# Patient Record
Sex: FEMALE | Race: BLACK OR AFRICAN AMERICAN | NOT HISPANIC OR LATINO | URBAN - METROPOLITAN AREA
[De-identification: names, ages, dates, MRNs, and addresses within clinical notes are randomized per-mention and may not be internally consistent; named-entity substitution may affect disease eponyms.]

---

## 2021-06-13 ENCOUNTER — EMERGENCY (EMERGENCY)
Facility: HOSPITAL | Age: 31
LOS: 0 days | Discharge: ANOTHER HOSPITAL | End: 2021-06-13
Attending: EMERGENCY MEDICINE
Payer: SELF-PAY

## 2021-06-13 ENCOUNTER — OUTPATIENT (OUTPATIENT)
Dept: OUTPATIENT SERVICES | Facility: HOSPITAL | Age: 31
LOS: 1 days | End: 2021-06-13

## 2021-06-13 VITALS
DIASTOLIC BLOOD PRESSURE: 66 MMHG | RESPIRATION RATE: 18 BRPM | OXYGEN SATURATION: 99 % | HEART RATE: 100 BPM | SYSTOLIC BLOOD PRESSURE: 109 MMHG

## 2021-06-13 VITALS
OXYGEN SATURATION: 97 % | DIASTOLIC BLOOD PRESSURE: 78 MMHG | WEIGHT: 198.42 LBS | SYSTOLIC BLOOD PRESSURE: 114 MMHG | RESPIRATION RATE: 19 BRPM | HEART RATE: 105 BPM | TEMPERATURE: 99 F | HEIGHT: 65 IN

## 2021-06-13 VITALS
TEMPERATURE: 99 F | DIASTOLIC BLOOD PRESSURE: 80 MMHG | HEART RATE: 96 BPM | SYSTOLIC BLOOD PRESSURE: 137 MMHG | RESPIRATION RATE: 16 BRPM

## 2021-06-13 VITALS — TEMPERATURE: 98 F

## 2021-06-13 DIAGNOSIS — Z20.822 CONTACT WITH AND (SUSPECTED) EXPOSURE TO COVID-19: ICD-10-CM

## 2021-06-13 DIAGNOSIS — O36.8390 MATERNAL CARE FOR ABNORMALITIES OF THE FETAL HEART RATE OR RHYTHM, UNSPECIFIED TRIMESTER, NOT APPLICABLE OR UNSPECIFIED: ICD-10-CM

## 2021-06-13 DIAGNOSIS — Z34.90 ENCOUNTER FOR SUPERVISION OF NORMAL PREGNANCY, UNSPECIFIED, UNSPECIFIED TRIMESTER: ICD-10-CM

## 2021-06-13 DIAGNOSIS — O36.8190 DECREASED FETAL MOVEMENTS, UNSPECIFIED TRIMESTER, NOT APPLICABLE OR UNSPECIFIED: ICD-10-CM

## 2021-06-13 LAB
ALBUMIN SERPL ELPH-MCNC: 2.8 G/DL — LOW (ref 3.3–5)
ALLERGY+IMMUNOLOGY DIAG STUDY NOTE: SIGNIFICANT CHANGE UP
ALP SERPL-CCNC: 90 U/L — SIGNIFICANT CHANGE UP (ref 40–120)
ALT FLD-CCNC: 31 U/L — SIGNIFICANT CHANGE UP (ref 12–78)
ANION GAP SERPL CALC-SCNC: 7 MMOL/L — SIGNIFICANT CHANGE UP (ref 5–17)
ANISOCYTOSIS BLD QL: SLIGHT — SIGNIFICANT CHANGE UP
AST SERPL-CCNC: 22 U/L — SIGNIFICANT CHANGE UP (ref 15–37)
BASOPHILS # BLD AUTO: 0 K/UL — SIGNIFICANT CHANGE UP (ref 0–0.2)
BASOPHILS NFR BLD AUTO: 0 % — SIGNIFICANT CHANGE UP (ref 0–2)
BILIRUB SERPL-MCNC: 0.6 MG/DL — SIGNIFICANT CHANGE UP (ref 0.2–1.2)
BLD GP AB SCN SERPL QL: SIGNIFICANT CHANGE UP
BUN SERPL-MCNC: 7 MG/DL — SIGNIFICANT CHANGE UP (ref 7–23)
CALCIUM SERPL-MCNC: 8.8 MG/DL — SIGNIFICANT CHANGE UP (ref 8.5–10.1)
CHLORIDE SERPL-SCNC: 108 MMOL/L — SIGNIFICANT CHANGE UP (ref 96–108)
CO2 SERPL-SCNC: 23 MMOL/L — SIGNIFICANT CHANGE UP (ref 22–31)
CREAT SERPL-MCNC: 0.63 MG/DL — SIGNIFICANT CHANGE UP (ref 0.5–1.3)
DIR ANTIGLOB POLYSPECIFIC INTERPRETATION: SIGNIFICANT CHANGE UP
EOSINOPHIL # BLD AUTO: 0.09 K/UL — SIGNIFICANT CHANGE UP (ref 0–0.5)
EOSINOPHIL NFR BLD AUTO: 1 % — SIGNIFICANT CHANGE UP (ref 0–6)
FLUAV AG NPH QL: SIGNIFICANT CHANGE UP
FLUBV AG NPH QL: SIGNIFICANT CHANGE UP
GLUCOSE SERPL-MCNC: 117 MG/DL — HIGH (ref 70–99)
HCG SERPL-ACNC: HIGH MIU/ML
HCT VFR BLD CALC: 35.9 % — SIGNIFICANT CHANGE UP (ref 34.5–45)
HGB BLD-MCNC: 11.9 G/DL — SIGNIFICANT CHANGE UP (ref 11.5–15.5)
HYPOCHROMIA BLD QL: SLIGHT — SIGNIFICANT CHANGE UP
LYMPHOCYTES # BLD AUTO: 1.29 K/UL — SIGNIFICANT CHANGE UP (ref 1–3.3)
LYMPHOCYTES # BLD AUTO: 14 % — SIGNIFICANT CHANGE UP (ref 13–44)
MANUAL SMEAR VERIFICATION: SIGNIFICANT CHANGE UP
MCHC RBC-ENTMCNC: 26.6 PG — LOW (ref 27–34)
MCHC RBC-ENTMCNC: 33.1 GM/DL — SIGNIFICANT CHANGE UP (ref 32–36)
MCV RBC AUTO: 80.1 FL — SIGNIFICANT CHANGE UP (ref 80–100)
MICROCYTES BLD QL: SLIGHT — SIGNIFICANT CHANGE UP
MONOCYTES # BLD AUTO: 0.37 K/UL — SIGNIFICANT CHANGE UP (ref 0–0.9)
MONOCYTES NFR BLD AUTO: 4 % — SIGNIFICANT CHANGE UP (ref 2–14)
NEUTROPHILS # BLD AUTO: 7.48 K/UL — HIGH (ref 1.8–7.4)
NEUTROPHILS NFR BLD AUTO: 81 % — HIGH (ref 43–77)
NRBC # BLD: 0 /100 — SIGNIFICANT CHANGE UP (ref 0–0)
NRBC # BLD: SIGNIFICANT CHANGE UP /100 WBCS (ref 0–0)
PLAT MORPH BLD: NORMAL — SIGNIFICANT CHANGE UP
PLATELET # BLD AUTO: 218 K/UL — SIGNIFICANT CHANGE UP (ref 150–400)
POLYCHROMASIA BLD QL SMEAR: SLIGHT — SIGNIFICANT CHANGE UP
POTASSIUM SERPL-MCNC: 3.7 MMOL/L — SIGNIFICANT CHANGE UP (ref 3.5–5.3)
POTASSIUM SERPL-SCNC: 3.7 MMOL/L — SIGNIFICANT CHANGE UP (ref 3.5–5.3)
PROT SERPL-MCNC: 6.9 GM/DL — SIGNIFICANT CHANGE UP (ref 6–8.3)
RBC # BLD: 4.48 M/UL — SIGNIFICANT CHANGE UP (ref 3.8–5.2)
RBC # FLD: 14.5 % — SIGNIFICANT CHANGE UP (ref 10.3–14.5)
RBC BLD AUTO: ABNORMAL
SARS-COV-2 RNA SPEC QL NAA+PROBE: SIGNIFICANT CHANGE UP
SODIUM SERPL-SCNC: 138 MMOL/L — SIGNIFICANT CHANGE UP (ref 135–145)
WBC # BLD: 9.23 K/UL — SIGNIFICANT CHANGE UP (ref 3.8–10.5)
WBC # FLD AUTO: 9.23 K/UL — SIGNIFICANT CHANGE UP (ref 3.8–10.5)

## 2021-06-13 PROCEDURE — 86077 PHYS BLOOD BANK SERV XMATCH: CPT

## 2021-06-13 PROCEDURE — 99284 EMERGENCY DEPT VISIT MOD MDM: CPT

## 2021-06-13 NOTE — ED ADULT NURSE NOTE - OBJECTIVE STATEMENT
32 y/o female 33 weeks pregnant with c/c of non fetal movement since 930 pm. Baby is very active on a daily basis. pt denies any n/v/d/c

## 2021-06-13 NOTE — OB PROVIDER TRIAGE NOTE - HISTORY OF PRESENT ILLNESS
32 y/o  @ 32.6 wks gestation presents with c/o decrease FM since last evening pt [place] 2 [place] ago and states her last PNV was ~ 1 month ago and arrived this AM @ [facility] and now reports +FM denies any LOF or VB denies any n/v/d denies any fever or chills ap care uncomplicated thus far     pt is rH- neg and recd Rhogam @ 28 wks gestation   pt given clinic phone numbers     cat 1 FHT  toco: x's 2 uterine contractions noted pt denies any c/o at this time   TAS: BPP;  vtx posterior placenta INNA: 10.65 EFW:1938 grams   T(C): 36.9 (21 @ 12:28), Max: 37.2 (21 @ 10:15)  HR: 97 (21 @ 12:39) (97 - 105)  BP: 123/74 (21 @ 12:39) (109/66 - 123/74)  RR: 18 (21 @ 11:02) (18 - 19)  SpO2: 99% (21 @ 11:02) (97% - 99%)

## 2021-06-13 NOTE — ED PROVIDER NOTE - IV ALTEPLASE INCLUSION HIDDEN
"Chief Complaint   Patient presents with     Prenatal Care     6 week pp       Initial /57 (BP Location: Left arm, Patient Position: Chair, Cuff Size: Adult Regular)  Pulse 63  Temp 98.2  F (36.8  C) (Oral)  Resp 16  Wt 157 lb 14.4 oz (71.6 kg)  LMP 12/09/2016 (Exact Date)  SpO2 98%  Breastfeeding? Yes  BMI 27.1 kg/m2 Estimated body mass index is 27.1 kg/(m^2) as calculated from the following:    Height as of 9/20/17: 5' 4\" (1.626 m).    Weight as of this encounter: 157 lb 14.4 oz (71.6 kg).  Medication Reconciliation: complete  Carolina Squires M.A.  "
show

## 2021-06-13 NOTE — ED ADULT TRIAGE NOTE - CHIEF COMPLAINT QUOTE
33 week pregnant and not feeling baby moving since yesterday  Denies cramping and bleeding  Flew from [place] 2 weeks ago

## 2021-06-13 NOTE — ED PROVIDER NOTE - OBJECTIVE STATEMENT
Pt is a 31 year old female  LMP 10/26/20, who presents to the ED today for decreased fetal movement. Pt states baby has been active throughout the pregnancy but since 1 pm there has been decreased movement. Last movement 9:30 pm and has not felt anything since. Denies vaginal bleed leakage of fluids, fevers, hematuria, dysuria, abdominal pain, or vomit. Pt is accompanied by sister who states pt just arrived from [place] 1.5 weeks ago. Pt had prenatal care in [place] and treated once for a yeast infection. No other illness or infection.

## 2021-06-13 NOTE — OB PROVIDER TRIAGE NOTE - ADDITIONAL INSTRUCTIONS
30 y/o  @ 32.6 presents via EMS with c/o decrease FM and now reports +FM  maternal and fetal status reassuring   d/w dr cavanaugh  discharge home   PTL precautions d/w pt  increase fluid intake  instructed on fetal kickcounts  pt to make appt with [facility] clinic phone numbers given to pt  w/v discharge instructions given  discharged home

## 2021-06-13 NOTE — OB PROVIDER TRIAGE NOTE - NS_CHIEFCOMPLAINTOTHER_OBGYN_ALL_OB_FT
Transfer from [place].  CC:  Dec FM since 6/12/21 @ 2100.  [facility] in [facility]

## 2021-06-13 NOTE — OB PROVIDER TRIAGE NOTE - NSHPPHYSICALEXAM_GEN_ALL_CORE
cat 1 FHT  toco: x's 2 uterine contractions noted pt denies any c/o at this time   TAS: BPP; 8/8 vtx posterior placenta INNA: 10.65 EFW:1938 grams   T(C): 36.9 (06-13-21 @ 12:28), Max: 37.2 (06-13-21 @ 10:15)  HR: 97 (06-13-21 @ 12:39) (97 - 105)  BP: 123/74 (06-13-21 @ 12:39) (109/66 - 123/74)  RR: 18 (06-13-21 @ 11:02) (18 - 19)  SpO2: 99% (06-13-21 @ 11:02) (97% - 99%)

## 2021-06-13 NOTE — ED ADULT NURSE NOTE - PAIN RATING/NUMBER SCALE (0-10): ACTIVITY
Test Date:    2019-07-04               Test Time:    18:14:46

Technician:   DELILAH                                     

                                                     

MEASUREMENT RESULTS:                                       

Intervals:                                           

Rate:         89                                     

MI:           170                                    

QRSD:         92                                     

QT:           366                                    

QTc:          445                                    

Axis:                                                

P:            33                                     

MI:           170                                    

QRS:          14                                     

T:            89                                     

                                                     

INTERPRETIVE STATEMENTS:                                       

                                                     

Normal sinus rhythm

Normal ECG

No previous ECG available for comparison



Electronically Signed On 07-05-19 08:52:42 CDT by Bryn Martinez 0

## 2021-06-13 NOTE — ED PROVIDER NOTE - CLINICAL SUMMARY MEDICAL DECISION MAKING FREE TEXT BOX
Pregnant pt decreased fetal movement. Pregnancy confirmed heart rate present will transfer pt to facility to obsteric care for continued fetal monitoring.

## 2021-06-14 LAB — ANTIBODY INTERPRETATION 2: SIGNIFICANT CHANGE UP

## 2023-08-23 NOTE — OB PROVIDER TRIAGE NOTE - NSOBPROVIDERNOTE_OBGYN_ALL_OB_FT
Location #4: feet 30 y/o  @ 32.6 presents via EMS with c/o decrease FM and now reports +FM  maternal and fetal status reassuring   d/w dr cavanaugh  discharge home   PTL precautions d/w pt  increase fluid intake  instructed on fetal kickcounts  pt to make appt with PCAP clinic phone numbers given to pt  w/v discharge instructions given  discharged home